# Patient Record
Sex: MALE | Race: BLACK OR AFRICAN AMERICAN | URBAN - METROPOLITAN AREA
[De-identification: names, ages, dates, MRNs, and addresses within clinical notes are randomized per-mention and may not be internally consistent; named-entity substitution may affect disease eponyms.]

---

## 2021-03-02 ENCOUNTER — IMMUNIZATION (OUTPATIENT)
Dept: INTERNAL MEDICINE CLINIC | Age: 61
End: 2021-03-02
Payer: COMMERCIAL

## 2021-03-02 DIAGNOSIS — Z23 ENCOUNTER FOR IMMUNIZATION: Primary | ICD-10-CM

## 2021-03-02 PROCEDURE — 91300 COVID-19, MRNA, LNP-S, PF, 30MCG/0.3ML DOSE(PFIZER): CPT | Performed by: FAMILY MEDICINE

## 2021-03-02 PROCEDURE — 0001A COVID-19, MRNA, LNP-S, PF, 30MCG/0.3ML DOSE(PFIZER): CPT | Performed by: FAMILY MEDICINE

## 2021-03-23 ENCOUNTER — IMMUNIZATION (OUTPATIENT)
Dept: INTERNAL MEDICINE CLINIC | Age: 61
End: 2021-03-23
Payer: COMMERCIAL

## 2021-03-23 DIAGNOSIS — Z23 ENCOUNTER FOR IMMUNIZATION: Primary | ICD-10-CM

## 2021-03-23 PROCEDURE — 0002A COVID-19, MRNA, LNP-S, PF, 30MCG/0.3ML DOSE(PFIZER): CPT | Performed by: FAMILY MEDICINE

## 2021-03-23 PROCEDURE — 91300 COVID-19, MRNA, LNP-S, PF, 30MCG/0.3ML DOSE(PFIZER): CPT | Performed by: FAMILY MEDICINE

## 2022-06-08 ENCOUNTER — OFFICE VISIT (OUTPATIENT)
Dept: NEUROLOGY | Age: 62
End: 2022-06-08
Payer: COMMERCIAL

## 2022-06-08 DIAGNOSIS — M47.22 CERVICAL RADICULOPATHY DUE TO DEGENERATIVE JOINT DISEASE OF SPINE: ICD-10-CM

## 2022-06-08 DIAGNOSIS — M79.642 BILATERAL HAND PAIN: ICD-10-CM

## 2022-06-08 DIAGNOSIS — G56.03 BILATERAL CARPAL TUNNEL SYNDROME: Primary | ICD-10-CM

## 2022-06-08 DIAGNOSIS — M79.641 BILATERAL HAND PAIN: ICD-10-CM

## 2022-06-08 PROCEDURE — 95911 NRV CNDJ TEST 9-10 STUDIES: CPT | Performed by: PSYCHIATRY & NEUROLOGY

## 2022-06-08 PROCEDURE — 95886 MUSC TEST DONE W/N TEST COMP: CPT | Performed by: PSYCHIATRY & NEUROLOGY

## 2022-06-08 NOTE — PROCEDURES
ELECTRODIAGNOSTIC REPORT      Test Date:  2022    Patient: Min Husain : 1960 Physician: Kaylee Kirk M.D. Sex: Male  < Ref Phys:      Technician: Aristeo Mejia    Patient History: Patient is a 66-year-old male with a history of aching pains in both hands, in the fingers and wrist, but not with much numbness or weakness, and seems to be slowly getting worse, and has a positive lupus test and arthritis test apparently, and is for EMG to rule out carpal tunnel syndrome rule out cervical radiculopathy, rule out entrapment neuropathy, or other neuromuscular disease. Neuro Exam: Patient has symmetric reflexes throughout and normal muscle bulk and tone and strength in all extremities though he does favor his hands because of pain. Cranial nerves II through XII intact, sensory examination to pin and double simultaneous stimulation is intact, and no Babinski or clonus is present. Gait and station is normal.    EMG report: This study shows electrophysiologic evidence of an essentially normal EMG and nerve conduction velocity study of both upper extremities, showing no clear evidence of entrapment neuropathy, radiculopathy, or other neuromuscular disease. Clinical correlation and correlation with imaging modalities and metabolic studies may be of further diagnostic benefit in this patient if clinically indicated. EMG & NCV Findings:  Evaluation of the left median motor and the right median motor nerves showed normal distal onset latency (L3.5, R3.8 ms), normal amplitude (L6.4, R6.7 mV), and normal conduction velocity (Elbow-Wrist, L57, R59 m/s). The left ulnar motor nerve showed normal distal onset latency (2.9 ms), reduced amplitude (5.4 mV), decreased conduction velocity (Wrist-Abd Dig Minimi, 28 m/s), normal conduction velocity (B Elbow-Wrist, 65 m/s), and normal conduction velocity (A Elbow-B Elbow, 59 m/s).   The right ulnar motor nerve showed normal distal onset latency (2.7 ms), normal amplitude (8.2 mV), decreased conduction velocity (Wrist-Abd Dig Minimi, 30 m/s), normal conduction velocity (B Elbow-Wrist, 59 m/s), and normal conduction velocity (A Elbow-B Elbow, 59 m/s). The left median sensory, the right median sensory, the left radial sensory, the right radial sensory, the left ulnar sensory, and the right ulnar sensory nerves showed normal distal peak latency (L3.1, R3.5, L1.9, R2.0, L3.3, R3.1 ms) and normal amplitude (L14.7, R12.7, L31.7, R30.6, L16.5, R12.7 µV). All left vs. right side differences were within normal limits.                 __________________  Can Hewitt MD        Nerve Conduction Studies  Anti Sensory Summary Table     Stim Site NR Onset (ms) Peak (ms) O-P Amp (µV) Norm Peak (ms) Norm O-P Amp Site1 Site2 Dist (cm) Norm Barron (m/s)   Left Median Anti Sensory (2nd Digit)  35.6°C   Wrist    2.2 3.1 14.7 <4 >11 Wrist 2nd Digit 14.0    Right Median Anti Sensory (2nd Digit)  35.6°C   Wrist    2.8 3.5 12.7 <4 >11 Wrist 2nd Digit 14.0    Left Radial Anti Sensory (Base 1st Digit)  35.6°C   Wrist    1.3 1.9 31.7 <2.9 >15 Wrist Base 1st Digit 10.0    Right Radial Anti Sensory (Base 1st Digit)  35.6°C   Wrist    1.5 2.0 30.6 <2.9 >15 Wrist Base 1st Digit 10.0    Left Ulnar Anti Sensory (5th Digit)  35.6°C   Wrist    2.2 3.3 16.5 <4.0 >10 Wrist 5th Digit 14.0    Right Ulnar Anti Sensory (5th Digit)  35.6°C   Wrist    2.2 3.1 12.7 <4.0 >10 Wrist 5th Digit 14.0      Motor Summary Table     Stim Site NR Onset (ms) Norm Onset (ms) O-P Amp (mV) Norm O-P Amp P-T Amp (mV) Site1 Site2 Dist (cm) Barron (m/s)   Left Median Motor (Abd Poll Brev)  35.6°C   Wrist    3.5 <4.5 6.4 >4.1  Wrist Abd Poll Brev 8.0 23   Elbow    8.1  5.7   Elbow Wrist 26.0 57   Right Median Motor (Abd Poll Brev)  35.6°C   Wrist    3.8 <4.5 6.7 >4.1  Wrist Abd Poll Brev 8.0 21   Elbow    8.4  6.2   Elbow Wrist 27.0 59   Left Ulnar Motor (Abd Dig Minimi)  35.6°C   Wrist    2.9 <3.1 5.4 >7.0  Wrist Abd Dig Minimi 8.0 28   B Elbow    6.9  5.2   B Elbow Wrist 26.0 65   A Elbow    8.6  5.0   A Elbow B Elbow 10.0 59   Right Ulnar Motor (Abd Dig Minimi)  35.6°C   Wrist    2.7 <3.1 8.2 >7.0  Wrist Abd Dig Minimi 8.0 30   B Elbow    7.1  7.1   B Elbow Wrist 26.0 59   A Elbow    8.8  7.3   A Elbow B Elbow 10.0 59     EMG     Side Muscle Nerve Root Ins Act Fibs Psw Recrt Duration Amp Poly Comment   Right Deltoid Axillary C5-6 Nml Nml Nml Nml Nml Nml Nml    Right Biceps Musculocut C5-6 Nml Nml Nml Nml Nml Nml Nml    Left Biceps Musculocut C5-6 Nml Nml Nml Nml Nml Nml Nml    Left Deltoid Axillary C5-6 Nml Nml Nml Nml Nml Nml Nml    Right Triceps Radial C6-7-8 Nml Nml Nml Nml Nml Nml Nml    Left Triceps Radial C6-7-8 Nml Nml Nml Nml Nml Nml Nml    Left Lower Cerv Parasp Rami C7,T1 Nml Nml Nml Nml Nml Nml Nml    Right Lower Cerv Parasp Rami C7,T1 Nml Nml Nml Nml Nml Nml Nml    Left FlexPolLong Median (Ant Int) C7-8 Nml Nml Nml Nml Nml Nml Nml    Right FlexPolLong Median (Ant Int) C7-8 Nml Nml Nml Nml Nml Nml Nml    Left 1stDorInt Ulnar C8-T1 Nml Nml Nml Nml Nml Nml Nml    Right Abd Poll Brev Median C8-T1 Nml Nml Nml Nml Nml Nml Nml    Left Abd Poll Brev Median C8-T1 Nml Nml Nml Nml Nml Nml Nml    Right 1stDorInt Ulnar C8-T1 Nml Nml Nml Nml Nml Nml Nml          Waveforms:

## 2022-06-08 NOTE — LETTER
6/8/2022 11:14 AM    Patient:  Hermila Cabrera   YOB: 1960  Date of Visit: 6/8/2022      Dear   No Recipients: Thank you for referring Mr. Myra Bush to me for evaluation/treatment. Below are the relevant portions of my assessment and plan of care. Patient's EMG was essentially normal, showing no clear evidence of entrapment neuropathy, cervical radiculopathy, or carpal tunnel syndrome, and clinical correlation is recommended. If you have questions, please do not hesitate to call me. I look forward to following Mr. Denise Reyes along with you.         Sincerely,      [facility]

## 2022-06-08 NOTE — PROGRESS NOTES
Patient's EMG was essentially normal, showing no clear evidence of entrapment neuropathy, cervical radiculopathy, or carpal tunnel syndrome, and clinical correlation is recommended.

## 2022-09-13 ENCOUNTER — TRANSCRIBE ORDER (OUTPATIENT)
Dept: REGISTRATION | Age: 62
End: 2022-09-13

## 2022-09-13 DIAGNOSIS — M05.79 SEROPOSITIVE RHEUMATOID ARTHRITIS OF MULTIPLE SITES (HCC): Primary | ICD-10-CM

## 2022-09-13 DIAGNOSIS — M54.50 LUMBAGO: ICD-10-CM

## 2023-04-21 DIAGNOSIS — M05.79 SEROPOSITIVE RHEUMATOID ARTHRITIS OF MULTIPLE SITES (HCC): Primary | ICD-10-CM
